# Patient Record
(demographics unavailable — no encounter records)

---

## 2023-01-01 NOTE — NEWBORN INFANT-DISCHARGE
Discharge Summary


Subjective/Events-Last Exam


Feeding, voiding and stooling well. No concerns.


Date Patient Was Seen:  2023


Time Patient Was Seen:  11:15





Condition/Feeding


[place] Feeding Method:  Breast Milk-Exclusive, Bottle-Formula


Infant/Mother Supplement:  Hypoglycemia





Discharge Examination


Level of Alertness:  Alert


Cry Description:  Lusty


Activity/State:  Quiet Alert


Suckling:  Rhythmically,Lips Flanged


Skin:  Jaundice (mild)


Head Circumference:  14.50


Fontanelles:  Soft, Flat


Cephalohematoma:  No


Sclera Description:  Clear


Ears:  Normal; No Low Set


Mouth, Nose, Eyes:  Hard & Soft Palate Intact


Red Reflex of the Eyes:  Present bilaterally


Neck:  Head Mobile, Clavicles Intact


Chest Circumference:  14.13


Cardiovascular:  Regular Rhythm; No Murmur; Femoral Pulses Equal


Respiratory:  Regular, Unlabored


Breath Sounds:  Clear, Equal


Caput Succedaneum:  No


Abdomen:  Soft; No Distended; Bowel Sounds Audible


Abdomen Circumference:  13.50


Genitalia:  Appear Normal, Testicles Descended


Back:  Spine Closed, Gluteal Folds Equal, Anus Patent; No Sacral Dimple


Hips:  WNL; No Hip Click Lt Side, No Hip Click Rt Side


Movement:  Symmetric-Body, Full ROM, Symmetric-Face


Muscle Tone:  Active


Extremities:  5 digits present on each extremity


Reflexes:  Christ[place]er, Suck, Grasp-Bilateral





Weight/Height


Birth Weight:  3997


Height (Inches):  20.50


Height (Calculated Centimeters:  52.627518


Weight (Pounds):  8


Weight (Ounces):  2.7


Weight (Calculated Kilograms):  3.116479


Weight (Calculated Grams):  3705.283





Hearing Screening


Date of Hearing Screening:  2023


Results of Hearing Screening:  Pass





Discharge Instructions


Hep B Vaccine Given?:  Yes


PKU/Bili Done?:  Yes


Cord Clamp Off?:  Yes


Discharge Diagnosis/Impression:  Birth, Infant, Living, Term


Assessment/Instructions


See below


Hospital Course


Date of Admission: 2023 at 15:16 


Admission Diagnosis :  





Family Physician/Provider:   





Date of Discharge: 23 


Discharge Diagnosis: [ ]








Hospital Course:


[ ]














Labs and Pending Lab Test:


Laboratory Tests


23 15:56: Glucometer 56


23 16:36: 


 Total Bilirubin 5.9L, Phenylalanine PKU [place] Screen [Pending]


23 23:06: Glucometer 51





Home Meds


Active


No Active Prescriptions or Reported Medications


Diagnosis/Problems:  


(1) Term birth of male 


Assessment & Plan:  


23: Term LGA  male infant born at 37 and 2/7 WGA via primary c-sect

ion due to fetal intolerance of labor to G3 now P2 mother. Date/Time of birth: 

23 at 15:16 Prenatal testing: negative for GBS, Hep BsAg, HIV and RPR; 

rubella immune. Birth weight 3997 grams; apgars 8/9, maternal blood type B+, 

infant blood type O+ with negative COURTNEY. Parents do not desire circumcision, plan

to have baby follow up with Dr. Fortune after discharge, who is PCP for their 

other child. Blood sugars were monitored for the fist 24 hours of life due to L

GA status and have been in normal range. Breast-feeding fair, supplementing with

formula after each feeding to support blood sugars. Feeding, voiding and 

stooling well. No concerns. Discharge weight 3705 grams, which is 7% below birth

weight at 2 days of age. Bilirubin level was 5.9 at 25 hours of age. Passed 

hearing screen and CCHD screen.


* Discharge home today.


* Continue to offer breast first, then supplement with formula until breast-milk

  supply has come in.


* Follow up with Dr. Fortune on 23.


       [email or web address]





Bilirubin management summary based on  AAP guidelines


PATIENT SUMMARY:


Infant age at samplin hours 


Total Bilirubin: 5.9 mg/dL


Gestational Age: 37 weeks


Additional Risk Factors: No


Bilirubin trend: Not available (sequential data not provided).





RECOMMENDATIONS (THRESHOLDS):


Check serum bilirubin if using TcB? NO (9 mg/dL)


Phototherapy? NO (11.9 mg/dL)


Escalation of care? NO (18.5 mg/dL)


Exchange transfusion? NO (20.5 mg/dL)





POSTDISCHARGE FOLLOW UP:


For the baby 6 mg/dL below the phototherapy threshold (delta-TSB) at 25 hours of

age  (during birth hospitalization with no prior phototherapy): 


 If discharging < 72 hours, then follow-up within 2 days. Recheck TSB or TcB 

according to clinical judgment. If discharging ? 72 hours, then use clinical 

judgment.





Generated by [email or web address] (2023 16:48:27 [facility])








(2) LGA (large for gestational age) fetus


Avoid ALL Tobacco Products:  Second Hand Smoke


Pediatric Feeding Method:  Breast, Bottle


Pediatric Feeding Formula Type:  Similac


Parent Questions Call:  Nurse @ [phone] (or)


If Any Problems/Questions/Issu:  Contact Your Physician


Circumcision:  No


Baby discharge weight:  3705 grams





Copy


Copies To 1:   CHARMAINE FORTUNE MD, KRISTA L MD            2023 11:49

## 2023-01-01 NOTE — DISCHARGE INST-NURSERY
Discharge [facility]-Nursery


Instructions/Follow Up


Patient Instructions/Follow Up:  


Follow-up with Dr. Fortune on Monday 04/24/23. Offer breast first, may then


supplement with formula after each feeding if needed, until breast-milk


supply comes in. Limit breast-feeding attempts to 15 minutes on each


breast.





Activity


Avoid ALL Tobacco Products:  Second Hand Smoke





Diet


Pediatric Feeding Method:  Breast, Bottle


Pediatric Feeding Formula Type:  Similac





Symptoms Report to Physician


Parent Questions Call:  Nurse @ [phone] (or)


For Problems/Questions:  Contact Your Physician





Skin/Wound Care


Circumcision:  No


Baby Discharge Weight:  3705 grams











GAETANO RUIZ MD            Apr 20, 2023 11:34

## 2023-01-01 NOTE — NEWBORN INFANT H&P-ADMISSION
[facility] Infant Record


Exam Date & Time


Date seen by provider:  2023


Time seen by provider:  15:16


In OR





Provider


PCP


Shanna





Delivery Assessment


Expected Date of Delivery:  May 7, 2023


Hx :  3


Hx Para:  1


Gestational Age in Weeks:  37


Gestational Age in Days:  2


Amniotic Membrane Rupture Time:  08:30


Delivery Date:  2023


Delivery Time:  15:16


Gender:  Male


Single or Multiple Gestation:  Single


Condition of Infant:  Living


Infant Delivery Method:  Primary  Section


Operative Indications (Cesarea:  Fetal Distress (and intolerance to labor)


Anesthesia Type:  Epidural


Prenatal Events:  Routine Prenatal care


Intrapartal Events:  Extnded Fetal Bradycardia





Mother's Group Strep


Mother's Group B Strep:  Negative





Maternal Labs


Mother's HIV Status:  Negative


Mother's Hep B Status:  Negative


Mother's Hx Syphillis:  Negative


Rubella:  Immune





Apgar Score


Apgar Score at 1 Minute:  8





Condition/Feeding


Benefits of breastfeeding discussed with mother.


 Feeding Method:  Breast Milk-Exclusive





Admission Examination


Delivered outside facility:  No


Level of Alertness:  Alert


Activity/State:  Crying


Skin:  Vernix


Fontanelles:  Soft


Cephalohematoma:  No


Sclera Description:  Clear


Mouth, Nose, Eyes:  Hard & Soft Palate Intact


Neck:  Head Mobile, Clavicles Intact


Cardiovascular:  Regular Rhythm, Femoral Pulses Equal


Respiratory:  Regular


Breath Sounds:  Crackles


Genitalia:  Appear Normal, Testicles Descended


Back:  Spine Closed


Hips:  WNL


Movement:  Symmetric-Body


Extremities:  5 digits present on each extremity


Reflexes:  Christofer, Suck, Grasp-Bilateral





Weight/Height


Birth Weight:  3990


Weight (Pounds):  8


Weight (Ounces):  13





Impression on Admission


Impression on Admission:  Birth, Infant, Living, Term





Progress/Plan/Problem List





(1) Term birth of male 


Assessment & Plan:  - Expect routine  care


- Parents desire circ prior to d/c





(2) LGA (large for gestational age) fetus


Assessment & Plan:  - Blood sugar protocol














MILES BECKETT MD               2023 15:32

## 2023-01-01 NOTE — PROGRESS NOTE - NEWBORN
NB-Subjective/ROS


Subjective/ROS


Subjective/Events-last exam


infant is breast-feeding.  He is doing fairly well with feedings according to 

mother.  She does not desire to have him circumcised.





NB-Exam


Condition/Feeding


[place] Feeding Method:  Breast, Bottle





Examination


Vitals





Vital Signs








  Date Time  Temp Pulse Resp B/P (MAP) Pulse Ox O2 Delivery O2 Flow Rate FiO2


 


23 19:38 36.6 130 30     


 


23 16:00 36.7 137 56  100   


 


23 15:45 36.7 151 56  100   


 


23 15:30 36.9 161 58  99   








Level of Alertness:  Alert


Activity/State:  Crying


Skin:  Vernix


Head Circumference:  14.50


Fontanelles:  Soft


Cephalohematoma:  No


Sclera Description:  Clear


Mouth, Nose, Eyes:  Hard & Soft Palate Intact


Neck:  Head Mobile, Clavicles Intact


Chest Circumference:  14.13


Cardiovascular:  Regular Rhythm, Femoral Pulses Equal


Respiratory:  Regular


Breath Sounds:  Crackles


Abdomen Circumference:  13.50


Genitalia:  Appear Normal, Testicles Descended


Back:  Spine Closed


Hips:  WNL


Movement:  Symmetric-Body


Extremities:  5 digits present on each extremity


Reflexes:  [place], Suck, Grasp-Bilateral





Weight/Height(Last Documented)


Height (Inches):  20.50


Height (Calculated Centimeters:  52.488071


Weight (Pounds):  8


Weight (Ounces):  10.5


Weight (Calculated Kilograms):  3.878918


Weight (Calculated Grams):  3926.409





Labs


Labs


Laboratory Tests


23 16:02: Glucometer 48


23 21:02: Glucometer 47


23 01:48: Glucometer 44





NB-Plan/Progress


Plan/Progress


 AAP Hyperbilirubinemia Guidelines


[email or web address]


Diagnosis/Problems:  


(1) Term birth of male 


Assessment & Plan:  - Expect routine  care


- Parents desire circ prior to d/c








-Mother had change of plans and does not desire her son to have circumcision


-Continue with routine  care orders


-Infant doing well on breast





(2) LGA (large for gestational age) fetus


Assessment & Plan:  - Blood sugar protocol














MACHO LUIS MD              2023 08:20